# Patient Record
(demographics unavailable — no encounter records)

---

## 2025-05-30 NOTE — HISTORY OF PRESENT ILLNESS
[6] : 6 [2] : 2 [Dull/Aching] : dull/aching [de-identified] : L wrist mass for about 1 year  Pain with pressure and activities  Has h/o of ganglion cyst over the same area. Had excision of the cyst in January 2023 by Dr. Navarrete.  [FreeTextEntry1] : L wrist

## 2025-05-30 NOTE — REASON FOR VISIT
[FreeTextEntry2] : 05/30/2025: 23 year old female here today for: new patient L wrist pain and a cyst for a few years

## 2025-05-30 NOTE — ASSESSMENT
[FreeTextEntry1] : Ganglion cyst  This is an acute complicated injury with the possible need for surgical excision   I recommend the patient take over the counter medication such as Advil/Motrin/Aleve or Tylenol for pain and inflammation I rec the patient ice the affected body part for 10-15mins 3-4 times a day for 5 days to reduce pain, swelling, and inflammation   Activity modification  Return prn- consider injection vs surgery